# Patient Record
Sex: FEMALE | URBAN - METROPOLITAN AREA
[De-identification: names, ages, dates, MRNs, and addresses within clinical notes are randomized per-mention and may not be internally consistent; named-entity substitution may affect disease eponyms.]

---

## 2020-11-08 PROBLEM — K58.9 IBS (IRRITABLE BOWEL SYNDROME): Status: ACTIVE | Noted: 2020-11-08

## 2020-11-08 PROBLEM — F98.8 ADD (ATTENTION DEFICIT DISORDER): Status: ACTIVE | Noted: 2020-11-08

## 2020-12-30 ENCOUNTER — OFFICE VISIT (OUTPATIENT)
Dept: OBSTETRICS AND GYNECOLOGY | Facility: CLINIC | Age: 45
End: 2020-12-30
Payer: COMMERCIAL

## 2020-12-30 VITALS
SYSTOLIC BLOOD PRESSURE: 122 MMHG | BODY MASS INDEX: 42.69 KG/M2 | WEIGHT: 232 LBS | DIASTOLIC BLOOD PRESSURE: 76 MMHG | HEIGHT: 62 IN | HEART RATE: 87 BPM

## 2020-12-30 DIAGNOSIS — Z01.419 ENCOUNTER FOR WELL WOMAN EXAM WITH ROUTINE GYNECOLOGICAL EXAM: Primary | ICD-10-CM

## 2020-12-30 DIAGNOSIS — Z12.31 BREAST CANCER SCREENING BY MAMMOGRAM: ICD-10-CM

## 2020-12-30 DIAGNOSIS — R53.83 FATIGUE, UNSPECIFIED TYPE: ICD-10-CM

## 2020-12-30 DIAGNOSIS — M85.859 OSTEOPENIA OF HIP, UNSPECIFIED LATERALITY: ICD-10-CM

## 2020-12-30 DIAGNOSIS — N63.0 BREAST LUMP: ICD-10-CM

## 2020-12-30 DIAGNOSIS — L60.9 NAIL ABNORMALITIES: ICD-10-CM

## 2020-12-30 DIAGNOSIS — N64.4 BREAST PAIN: ICD-10-CM

## 2020-12-30 PROCEDURE — 1126F PR PAIN SEVERITY QUANTIFIED, NO PAIN PRESENT: ICD-10-PCS | Mod: S$GLB,,, | Performed by: OBSTETRICS & GYNECOLOGY

## 2020-12-30 PROCEDURE — 1126F AMNT PAIN NOTED NONE PRSNT: CPT | Mod: S$GLB,,, | Performed by: OBSTETRICS & GYNECOLOGY

## 2020-12-30 PROCEDURE — 3008F BODY MASS INDEX DOCD: CPT | Mod: CPTII,S$GLB,, | Performed by: OBSTETRICS & GYNECOLOGY

## 2020-12-30 PROCEDURE — 99396 PREV VISIT EST AGE 40-64: CPT | Mod: S$GLB,,, | Performed by: OBSTETRICS & GYNECOLOGY

## 2020-12-30 PROCEDURE — 99213 OFFICE O/P EST LOW 20 MIN: CPT | Mod: 25,S$GLB,, | Performed by: OBSTETRICS & GYNECOLOGY

## 2020-12-30 PROCEDURE — 99396 PR PREVENTIVE VISIT,EST,40-64: ICD-10-PCS | Mod: S$GLB,,, | Performed by: OBSTETRICS & GYNECOLOGY

## 2020-12-30 PROCEDURE — 99213 PR OFFICE/OUTPT VISIT, EST, LEVL III, 20-29 MIN: ICD-10-PCS | Mod: 25,S$GLB,, | Performed by: OBSTETRICS & GYNECOLOGY

## 2020-12-30 PROCEDURE — 3008F PR BODY MASS INDEX (BMI) DOCUMENTED: ICD-10-PCS | Mod: CPTII,S$GLB,, | Performed by: OBSTETRICS & GYNECOLOGY

## 2020-12-30 RX ORDER — DEXTROAMPHETAMINE SACCHARATE, AMPHETAMINE ASPARTATE MONOHYDRATE, DEXTROAMPHETAMINE SULFATE AND AMPHETAMINE SULFATE 6.25; 6.25; 6.25; 6.25 MG/1; MG/1; MG/1; MG/1
25 CAPSULE, EXTENDED RELEASE ORAL EVERY MORNING
COMMUNITY

## 2020-12-30 NOTE — PROGRESS NOTES
CC: Well Woman -hyst with BSO    HPI:  Patient is a 45 y.o.   who presents for her well woman exam today.  History reviewed with patient.      Patient also would like to discuss some other concerns she has at her wellness visit today. (see problem note below)    Past Medical History:   Diagnosis Date    Abdominal adhesions     severe- pt reports 15 scopes, colonoscopy showd partial obstruction    ADD (attention deficit disorder)     COVID-19 2020    Diabetes     History of left nephrectomy     Hyperinsulinemia     Hyperlipidemia     IBS (irritable bowel syndrome)     Kidney stones     Low vitamin D level     Osteopenia     Pneumonia 2020    Tachycardia     sees cards- sometimes to 160s- on metoprolol       Past Surgical History:   Procedure Laterality Date    BONE GRAFT  2018    for knee becuase wasnt healing    CHOLECYSTECTOMY      HIP SURGERY      had 3 surgeries for torn labrum    KIDNEY SURGERY Left     blocked upj from stone    LAPAROSCOPY      multiple laparoscopies for adhesions (15 per pt)    NEPHRECTOMY Left     after it necrosed    REDUCTION OF BOTH BREASTS      TONSILLECTOMY AND ADENOIDECTOMY  2018    TOTAL ABDOMINAL HYSTERECTOMY W/ BILATERAL SALPINGOOPHORECTOMY  1999    fibroids, adhesions       Social History     Tobacco Use    Smoking status: Never Smoker    Smokeless tobacco: Never Used   Substance Use Topics    Alcohol use: Yes     Alcohol/week: 2.0 standard drinks     Types: 2 Cans of beer per week     Comment: occ    Drug use: Never       Family History   Problem Relation Age of Onset    Breast cancer Other         MGGM    Diabetes Maternal Grandmother        Review of patient's allergies indicates:   Allergen Reactions    Cefprozil     Levofloxacin        Current Outpatient Medications:     dextroamphetamine-amphetamine (ADDERALL XR) 25 MG 24 hr capsule, Take 25 mg by mouth every morning., Disp: , Rfl:     metoprolol tartrate (LOPRESSOR) 100 MG  "tablet, Take 100 mg by mouth 2 (two) times daily., Disp: , Rfl:     exenatide (BYETTA) 10 mcg/dose(250 mcg/mL) 2.4 mL PnIj, Inject 10 mcg into the skin., Disp: , Rfl:     metFORMIN (GLUCOPHAGE) 500 MG tablet, Take 500 mg by mouth., Disp: , Rfl:     methylphenidate HCl 18 MG CR tablet, Take 18 mg by mouth every morning., Disp: , Rfl:     GYN HX:  HX ABNL PAPS:  no abnormals  HRT USE: never used  HYSTERECTOMY SINCE: 1999 for fibroids, pelvic pain, endometriosis and severe adhesions with BSO    HEALTH MAINTENANCE:  (PCP-Abdirizak Ponce MD)    LAST ANNUAL/ PAP:   September 25, 2019    LAST PAP:  neg  LAST MMG:  January 3, 2020  [facility] at [facility]  LAST LABS:  due now- does with pcp  LAST COLON: 2016- neg- Q 10 yrs- w/  in [place]  LAST DEXA: 2017 osteopenia at office    ROS:  Review of Systems   Constitutional: Negative for fatigue, fever and unexpected weight change.   Respiratory: Negative for cough and shortness of breath.    Cardiovascular: Negative for chest pain and palpitations.   Gastrointestinal: Negative for abdominal pain, bloating, blood in stool, constipation, diarrhea, nausea and vomiting.   Endocrine: Negative for hair loss and hot flashes.   Genitourinary: Negative for dysuria, frequency, genital sores, hematuria, menstrual problem, pelvic pain, urgency, vaginal discharge, urinary incontinence and vaginal odor.   Integumentary:  Negative for rash, acne, mole/lesion, breast mass, nipple discharge, breast skin changes and breast tenderness.   Neurological: Negative for headaches.   Psychiatric/Behavioral: Negative for depression and sleep disturbance.   Breast: Negative for asymmetry, lump, mass, nipple discharge, skin changes and tenderness    VITALS:  No LMP recorded. Patient has had a hysterectomy.  Vitals:    12/30/20 0902   BP: 122/76   Pulse: 87   Weight: 105.2 kg (232 lb)   Height: 5' 2" (1.575 m)     Body mass index is 42.43 kg/m².     PHYSICAL EXAM:  Physical Exam:   Constitutional: She is " oriented to person, place, and time. She appears well-developed and well-nourished. She is cooperative. She does not appear ill.      Neck: No thyromegaly present.    Cardiovascular: Normal rate.     Pulmonary/Chest: Effort normal. No respiratory distress. She exhibits no deformity. Right breast exhibits no mass, no nipple discharge, no skin change, no tenderness and no swelling. Left breast exhibits no mass, no nipple discharge, no skin change, no tenderness and no swelling. Breasts are symmetrical.        Abdominal: Soft. Normal appearance. She exhibits no distension. There is no splenomegaly or hepatomegaly. There is no abdominal tenderness. Hernia confirmed negative in the umbilical area and confirmed negative in the ventral area.     Genitourinary:    Vagina normal.      Pelvic exam was performed with patient supine.   There is no rash, tenderness or lesion on the right labia. There is no rash, tenderness or lesion on the left labia. Uterus is absent. Right adnexum displays no mass, no tenderness and no fullness. Left adnexum displays no mass, no tenderness and no fullness. No erythema, tenderness, rectocele, cystocele or unspecified prolapse of vaginal walls in the vagina. Vaginal cuff normal.Labial bartholins normal.Cervix exhibits absence. negative for vaginal discharge              Neurological: She is alert and oriented to person, place, and time.    Skin: Skin is warm and dry. No lesion and no rash noted.    Psychiatric: She has a normal mood and affect. Her speech is normal and behavior is normal. Judgment and thought content normal.     *female chaperone present for exam    ASSESSMENT and PLAN:  Encounter for well woman exam with routine gynecological exam  -     Liquid-based pap smear, screening    Breast cancer screening by mammogram  -     Mammo Digital Screening Bilat w/ Hernandez; Future; Expected date: 01/29/2021    Osteopenia of hip, unspecified laterality  -     DXA Bone Density Spine And Hip;  Future; Expected date: 01/30/2021      FOLLOWUP:  Follow up in about 1 year (around 12/30/2021).     COUNSELING:  Patient was counseled today on A.C.S. Pap guidelines and recommendations for yearly pelvic exam, monthly self breast exams, annual mammograms, and screening colonoscopy starting at age 50. Encouraged patient to see her PCP for other health maintenance.       PROBLEM VISIT:    PROBLEM HPI:     Pt c/o fatigue and nails twisting and brittle and wants labs- hasnt had labs this year and not sure if it is just recovering from covid or if something else is wrong.  Also has had a tender lump in left lateral breast for last few months.     Review of Systems   Constitutional: Negative for fatigue, fever and unexpected weight change.   Respiratory: Negative for cough and shortness of breath.    Cardiovascular: Negative for chest pain and palpitations.   Gastrointestinal: Negative for abdominal pain, bloating, blood in stool, constipation, diarrhea, nausea and vomiting.   Endocrine: Negative for hair loss and hot flashes.   Genitourinary: Negative for dysuria, frequency, genital sores, hematuria, menstrual problem, pelvic pain, urgency, vaginal discharge, urinary incontinence and vaginal odor.   Integumentary:  Negative for rash, acne, mole/lesion, breast mass, nipple discharge, breast skin changes and breast tenderness.   Neurological: Negative for headaches.   Psychiatric/Behavioral: Negative for depression and sleep disturbance.   Breast: Negative for asymmetry, lump, mass, nipple discharge, skin changes and tenderness  Physical Exam:   Constitutional: She is oriented to person, place, and time. She appears well-developed and well-nourished. She is cooperative. She does not appear ill.      Neck: No thyromegaly present.    Cardiovascular: Normal rate.     Pulmonary/Chest: Effort normal. No respiratory distress. She exhibits no deformity. Right breast exhibits no mass, no nipple discharge, no skin change, no  tenderness and no swelling. Left breast exhibits no mass, no nipple discharge, no skin change, no tenderness and no swelling. Breasts are symmetrical.        Abdominal: Soft. Normal appearance. She exhibits no distension. There is no splenomegaly or hepatomegaly. There is no abdominal tenderness. Hernia confirmed negative in the umbilical area and confirmed negative in the ventral area.     Genitourinary:    Vagina normal.      Pelvic exam was performed with patient supine.   There is no rash, tenderness or lesion on the right labia. There is no rash, tenderness or lesion on the left labia. Uterus is absent. Right adnexum displays no mass, no tenderness and no fullness. Left adnexum displays no mass, no tenderness and no fullness. No erythema, tenderness, rectocele, cystocele or unspecified prolapse of vaginal walls in the vagina. Vaginal cuff normal.Labial bartholins normal.Cervix exhibits absence. negative for vaginal discharge              Neurological: She is alert and oriented to person, place, and time.    Skin: Skin is warm and dry. No lesion and no rash noted.    Psychiatric: She has a normal mood and affect. Her speech is normal and behavior is normal. Judgment and thought content normal.      PROBLEM ASSESMENT and PLAN:  Encounter for well woman exam with routine gynecological exam  -     Liquid-based pap smear, screening  -     Lipid Panel; Future; Expected date: 12/30/2020    Breast cancer screening by mammogram  -     Mammo Digital Screening Bilat w/ Hernandez; Future; Expected date: 01/29/2021    Osteopenia of hip, unspecified laterality  -     DXA Bone Density Spine And Hip; Future; Expected date: 01/30/2021    Breast lump  -     Mammo Digital Diagnostic Bilat with Hernandez; Future; Expected date: 01/06/2021  -     US Breast Left Complete; Future; Expected date: 12/31/2020    Breast pain  -     Mammo Digital Diagnostic Bilat with Hernandez; Future; Expected date: 01/06/2021  -     US Breast Left Complete; Future;  Expected date: 12/31/2020    Fatigue, unspecified type  -     Calcitriol; Future; Expected date: 12/30/2020  -     CBC Auto Differential; Future; Expected date: 12/30/2020  -     Comprehensive Metabolic Panel; Future; Expected date: 12/30/2020  -     Urinalysis, Reflex to Urine Culture Urine, Clean Catch; Future  -     T4, Free; Future; Expected date: 12/30/2020  -     TSH; Future; Expected date: 12/30/2020  -     JAGDISH by IFA, w/Rflx; Future; Expected date: 12/30/2020  -     Vitamin B12; Future; Expected date: 12/30/2020  -     Hemoglobin A1C; Future; Expected date: 12/30/2020    Nail abnormalities       *Total time spent: 30 min. 50 % or more was spent on counseling about diagnosis, treatment options, and coordination of care.

## 2021-01-04 LAB
ABS NRBC COUNT: 0 X 10 3/UL (ref 0–0.01)
ABSOLUTE BASOPHIL: 0.06 X 10 3/UL (ref 0–0.22)
ABSOLUTE EOSINOPHIL: 0.16 X 10 3/UL (ref 0.04–0.54)
ABSOLUTE IMMATURE GRAN: 0.02 X 10 3/UL (ref 0–0.04)
ABSOLUTE LYMPHOCYTE: 3.17 X 10 3/UL (ref 0.86–4.75)
ABSOLUTE MONOCYTE: 0.63 X 10 3/UL (ref 0.22–1.08)
ALBUMIN SERPL-MCNC: 4.2 G/DL (ref 3.5–5.2)
ALBUMIN/GLOB SERPL ELPH: 1.4 {RATIO} (ref 1–2.7)
ALP ISOS SERPL LEV INH-CCNC: 74 U/L (ref 35–105)
ALT (SGPT): 19 U/L (ref 0–33)
AMORPH URATE CRY URNS QL MICRO: NEGATIVE
ANA SER-ACNC: NEGATIVE
ANION GAP SERPL CALC-SCNC: 11 MMOL/L (ref 8–17)
AST SERPL-CCNC: 15 U/L (ref 0–32)
B12: 543 PG/ML (ref 232–1245)
BACTERIA #/AREA URNS HPF: ABNORMAL /[HPF]
BASOPHILS NFR BLD: 0.7 % (ref 0.2–1.2)
BILIRUB UR QL STRIP: NEGATIVE
BILIRUBIN, TOTAL: 0.22 MG/DL (ref 0–1.2)
BUN/CREAT SERPL: 16.7 (ref 6–20)
CALCIUM SERPL-MCNC: 9.5 MG/DL (ref 8.6–10.2)
CARBON DIOXIDE, CO2: 25 MMOL/L (ref 22–29)
CHLORIDE: 105 MMOL/L (ref 98–107)
CHOLEST SERPL-MSCNC: 189 MG/DL (ref 100–200)
CLARITY UR: ABNORMAL
COLOR UR: YELLOW
CREAT SERPL-MCNC: 0.79 MG/DL (ref 0.5–0.9)
EOSINOPHIL NFR BLD: 1.8 % (ref 0.7–7)
EPITHELIAL CELLS: ABNORMAL
ESTIMATED AVERAGE GLUCOSE: 148 MG/DL
GFR ESTIMATION: 79.06
GLOBULIN: 2.9 G/DL (ref 1.5–4.5)
GLUCOSE (UA): NEGATIVE MG/DL
GLUCOSE: 117 MG/DL (ref 74–106)
HBA1C MFR BLD: 6.8 % (ref 4–6)
HCT VFR BLD AUTO: 43 % (ref 37–47)
HDLC SERPL-MCNC: 51 MG/DL
HGB BLD-MCNC: 13 G/DL (ref 12–16)
IMMATURE GRANULOCYTES: 0.2 % (ref 0–0.5)
KETONES UR QL STRIP: NEGATIVE MG/DL
LDL/HDL RATIO: 1.9 (ref 1–3)
LDLC SERPL CALC-MCNC: 98.2 MG/DL (ref 0–100)
LEUKOCYTE ESTERASE UR QL STRIP: NEGATIVE
LYMPHOCYTES NFR BLD: 35.7 % (ref 19.3–53.1)
MCH RBC QN AUTO: 27.5 PG (ref 27–32)
MCHC RBC AUTO-ENTMCNC: 30.2 G/DL (ref 32–36)
MCV RBC AUTO: 91.1 FL (ref 82–100)
MONOCYTES NFR BLD: 7.1 % (ref 4.7–12.5)
MUCOUS THREADS URNS QL MICRO: NEGATIVE
NEUTROPHILS # BLD AUTO: 4.85 X 10 3/UL (ref 2.15–7.56)
NEUTROPHILS NFR BLD: 54.5 %
NITRITE UR QL STRIP: NEGATIVE
NUCLEATED RED BLOOD CELLS: 0 /100 WBC (ref 0–0.2)
OCCULT BLOOD: ABNORMAL
PH, URINE: 5 (ref 5–7.5)
PLATELET # BLD AUTO: 359 X 10 3/UL (ref 135–400)
POTASSIUM: 4.7 MMOL/L (ref 3.5–5.1)
PROT SNV-MCNC: 7.1 G/DL (ref 6.4–8.3)
PROT UR QL STRIP: NEGATIVE MG/DL
RBC # BLD AUTO: 4.72 X 10 6/UL (ref 4.2–5.4)
RBC/HPF: ABNORMAL
RDW-SD: 45.8 FL (ref 37–54)
SODIUM: 141 MMOL/L (ref 136–145)
SP GR UR STRIP: 1.02 (ref 1–1.03)
T4, FREE: 0.81 NG/DL (ref 0.93–1.7)
TRIGL SERPL-MCNC: 199 MG/DL (ref 0–150)
TSH SERPL DL<=0.005 MIU/L-ACNC: 1.89 UIU/ML (ref 0.27–4.2)
UREA NITROGEN (BUN): 13.2 MG/DL (ref 6–20)
UROBILINOGEN, URINE: NORMAL E.U./DL (ref 0–1)
VITAMIN D (25OHD): 29.5 NG/ML
WBC # BLD: 8.89 X 10 3/UL (ref 4.3–10.8)
WBC/HPF: ABNORMAL

## 2021-01-07 DIAGNOSIS — E03.9 HYPOTHYROIDISM, UNSPECIFIED TYPE: Primary | ICD-10-CM

## 2021-01-07 RX ORDER — LEVOTHYROXINE SODIUM 50 UG/1
50 TABLET ORAL
Qty: 30 TABLET | Refills: 11 | Status: SHIPPED | OUTPATIENT
Start: 2021-01-07 | End: 2022-01-07

## 2021-01-08 ENCOUNTER — TELEPHONE (OUTPATIENT)
Dept: OBSTETRICS AND GYNECOLOGY | Facility: CLINIC | Age: 46
End: 2021-01-08

## 2021-01-08 NOTE — PROGRESS NOTES
Please let pt know we have results for her:  -Pap and hpv neg  -Her thyroid is underactive and I will send out some synthroid to get her started on till she sees her pcp  -Her aic and glucose are elevated so she is prediabetic and needs to cut down on carbs in her diet and increase exercise and fiber intake  -her vitamin d3 was 29.5 and normal is 30-80 so she is just a little low but I would recommend 5000iu q d of vitamin D3 and she can recheck that and her thyroid labs in about 8 weeks.    - her cholesterol is also elevated  -Since she is on the portal she should be able to show these labs to her pcp for further recommendations

## 2021-01-11 ENCOUNTER — TELEPHONE (OUTPATIENT)
Dept: OBSTETRICS AND GYNECOLOGY | Facility: CLINIC | Age: 46
End: 2021-01-11

## 2021-01-13 ENCOUNTER — PATIENT MESSAGE (OUTPATIENT)
Dept: OBSTETRICS AND GYNECOLOGY | Facility: CLINIC | Age: 46
End: 2021-01-13

## 2021-01-14 DIAGNOSIS — R92.8 ABNORMAL SCREENING MAMMOGRAM: Primary | ICD-10-CM

## 2021-10-19 ENCOUNTER — OFFICE VISIT (OUTPATIENT)
Dept: OBSTETRICS AND GYNECOLOGY | Facility: CLINIC | Age: 46
End: 2021-10-19
Payer: COMMERCIAL

## 2021-10-19 VITALS
BODY MASS INDEX: 43.24 KG/M2 | HEART RATE: 87 BPM | WEIGHT: 235 LBS | HEIGHT: 62 IN | DIASTOLIC BLOOD PRESSURE: 78 MMHG | SYSTOLIC BLOOD PRESSURE: 110 MMHG

## 2021-10-19 DIAGNOSIS — N63.0 BREAST SWELLING: ICD-10-CM

## 2021-10-19 DIAGNOSIS — N64.4 BREAST PAIN: Primary | ICD-10-CM

## 2021-10-19 PROCEDURE — 3044F PR MOST RECENT HEMOGLOBIN A1C LEVEL <7.0%: ICD-10-PCS | Mod: CPTII,S$GLB,, | Performed by: OBSTETRICS & GYNECOLOGY

## 2021-10-19 PROCEDURE — 3044F HG A1C LEVEL LT 7.0%: CPT | Mod: CPTII,S$GLB,, | Performed by: OBSTETRICS & GYNECOLOGY

## 2021-10-19 PROCEDURE — 1160F PR REVIEW ALL MEDS BY PRESCRIBER/CLIN PHARMACIST DOCUMENTED: ICD-10-PCS | Mod: CPTII,S$GLB,, | Performed by: OBSTETRICS & GYNECOLOGY

## 2021-10-19 PROCEDURE — 3078F PR MOST RECENT DIASTOLIC BLOOD PRESSURE < 80 MM HG: ICD-10-PCS | Mod: CPTII,S$GLB,, | Performed by: OBSTETRICS & GYNECOLOGY

## 2021-10-19 PROCEDURE — 1159F MED LIST DOCD IN RCRD: CPT | Mod: CPTII,S$GLB,, | Performed by: OBSTETRICS & GYNECOLOGY

## 2021-10-19 PROCEDURE — 3078F DIAST BP <80 MM HG: CPT | Mod: CPTII,S$GLB,, | Performed by: OBSTETRICS & GYNECOLOGY

## 2021-10-19 PROCEDURE — 3008F PR BODY MASS INDEX (BMI) DOCUMENTED: ICD-10-PCS | Mod: CPTII,S$GLB,, | Performed by: OBSTETRICS & GYNECOLOGY

## 2021-10-19 PROCEDURE — 99214 PR OFFICE/OUTPT VISIT, EST, LEVL IV, 30-39 MIN: ICD-10-PCS | Mod: S$GLB,,, | Performed by: OBSTETRICS & GYNECOLOGY

## 2021-10-19 PROCEDURE — 99214 OFFICE O/P EST MOD 30 MIN: CPT | Mod: S$GLB,,, | Performed by: OBSTETRICS & GYNECOLOGY

## 2021-10-19 PROCEDURE — 3074F SYST BP LT 130 MM HG: CPT | Mod: CPTII,S$GLB,, | Performed by: OBSTETRICS & GYNECOLOGY

## 2021-10-19 PROCEDURE — 1159F PR MEDICATION LIST DOCUMENTED IN MEDICAL RECORD: ICD-10-PCS | Mod: CPTII,S$GLB,, | Performed by: OBSTETRICS & GYNECOLOGY

## 2021-10-19 PROCEDURE — 3074F PR MOST RECENT SYSTOLIC BLOOD PRESSURE < 130 MM HG: ICD-10-PCS | Mod: CPTII,S$GLB,, | Performed by: OBSTETRICS & GYNECOLOGY

## 2021-10-19 PROCEDURE — 1160F RVW MEDS BY RX/DR IN RCRD: CPT | Mod: CPTII,S$GLB,, | Performed by: OBSTETRICS & GYNECOLOGY

## 2021-10-19 PROCEDURE — 3008F BODY MASS INDEX DOCD: CPT | Mod: CPTII,S$GLB,, | Performed by: OBSTETRICS & GYNECOLOGY

## 2021-10-19 RX ORDER — DULAGLUTIDE 1.5 MG/.5ML
INJECTION, SOLUTION SUBCUTANEOUS
COMMUNITY
End: 2021-10-19

## 2021-10-19 RX ORDER — DULAGLUTIDE 0.75 MG/.5ML
INJECTION, SOLUTION SUBCUTANEOUS
COMMUNITY
Start: 2021-08-27 | End: 2022-01-04

## 2021-10-19 RX ORDER — METOPROLOL TARTRATE 37.5 MG/1
1 TABLET, FILM COATED ORAL 2 TIMES DAILY PRN
COMMUNITY
Start: 2021-09-09

## 2021-10-25 LAB
ANA SER-ACNC: NEGATIVE
PROLACTIN SERPL-MCNC: 6.46 NG/ML (ref 4.79–23.3)

## 2021-10-26 ENCOUNTER — TELEPHONE (OUTPATIENT)
Dept: OBSTETRICS AND GYNECOLOGY | Facility: CLINIC | Age: 46
End: 2021-10-26
Payer: COMMERCIAL

## 2021-10-31 DIAGNOSIS — N60.09 CYST OF BREAST, UNSPECIFIED LATERALITY: ICD-10-CM

## 2021-10-31 DIAGNOSIS — N64.4 BREAST PAIN: Primary | ICD-10-CM

## 2021-10-31 DIAGNOSIS — N63.0 BREAST SWELLING: ICD-10-CM

## 2021-11-01 ENCOUNTER — TELEPHONE (OUTPATIENT)
Dept: OBSTETRICS AND GYNECOLOGY | Facility: CLINIC | Age: 46
End: 2021-11-01
Payer: COMMERCIAL

## 2022-01-04 ENCOUNTER — OFFICE VISIT (OUTPATIENT)
Dept: OBSTETRICS AND GYNECOLOGY | Facility: CLINIC | Age: 47
End: 2022-01-04
Payer: COMMERCIAL

## 2022-01-04 VITALS
WEIGHT: 237.63 LBS | DIASTOLIC BLOOD PRESSURE: 69 MMHG | BODY MASS INDEX: 43.73 KG/M2 | HEIGHT: 62 IN | SYSTOLIC BLOOD PRESSURE: 106 MMHG | HEART RATE: 81 BPM

## 2022-01-04 DIAGNOSIS — Z12.31 BREAST CANCER SCREENING BY MAMMOGRAM: ICD-10-CM

## 2022-01-04 DIAGNOSIS — Z01.419 ENCOUNTER FOR WELL WOMAN EXAM WITH ROUTINE GYNECOLOGICAL EXAM: Primary | ICD-10-CM

## 2022-01-04 DIAGNOSIS — N64.4 BREAST PAIN: ICD-10-CM

## 2022-01-04 PROCEDURE — 3008F PR BODY MASS INDEX (BMI) DOCUMENTED: ICD-10-PCS | Mod: CPTII,S$GLB,, | Performed by: OBSTETRICS & GYNECOLOGY

## 2022-01-04 PROCEDURE — 99396 PREV VISIT EST AGE 40-64: CPT | Mod: S$GLB,,, | Performed by: OBSTETRICS & GYNECOLOGY

## 2022-01-04 PROCEDURE — 3074F PR MOST RECENT SYSTOLIC BLOOD PRESSURE < 130 MM HG: ICD-10-PCS | Mod: CPTII,S$GLB,, | Performed by: OBSTETRICS & GYNECOLOGY

## 2022-01-04 PROCEDURE — 1159F PR MEDICATION LIST DOCUMENTED IN MEDICAL RECORD: ICD-10-PCS | Mod: CPTII,S$GLB,, | Performed by: OBSTETRICS & GYNECOLOGY

## 2022-01-04 PROCEDURE — 3078F PR MOST RECENT DIASTOLIC BLOOD PRESSURE < 80 MM HG: ICD-10-PCS | Mod: CPTII,S$GLB,, | Performed by: OBSTETRICS & GYNECOLOGY

## 2022-01-04 PROCEDURE — 1159F MED LIST DOCD IN RCRD: CPT | Mod: CPTII,S$GLB,, | Performed by: OBSTETRICS & GYNECOLOGY

## 2022-01-04 PROCEDURE — 1160F RVW MEDS BY RX/DR IN RCRD: CPT | Mod: CPTII,S$GLB,, | Performed by: OBSTETRICS & GYNECOLOGY

## 2022-01-04 PROCEDURE — 1160F PR REVIEW ALL MEDS BY PRESCRIBER/CLIN PHARMACIST DOCUMENTED: ICD-10-PCS | Mod: CPTII,S$GLB,, | Performed by: OBSTETRICS & GYNECOLOGY

## 2022-01-04 PROCEDURE — 3008F BODY MASS INDEX DOCD: CPT | Mod: CPTII,S$GLB,, | Performed by: OBSTETRICS & GYNECOLOGY

## 2022-01-04 PROCEDURE — 3074F SYST BP LT 130 MM HG: CPT | Mod: CPTII,S$GLB,, | Performed by: OBSTETRICS & GYNECOLOGY

## 2022-01-04 PROCEDURE — 3078F DIAST BP <80 MM HG: CPT | Mod: CPTII,S$GLB,, | Performed by: OBSTETRICS & GYNECOLOGY

## 2022-01-04 PROCEDURE — 99396 PR PREVENTIVE VISIT,EST,40-64: ICD-10-PCS | Mod: S$GLB,,, | Performed by: OBSTETRICS & GYNECOLOGY

## 2022-01-04 RX ORDER — DULAGLUTIDE 1.5 MG/.5ML
INJECTION, SOLUTION SUBCUTANEOUS
COMMUNITY
Start: 2021-11-29 | End: 2023-04-05

## 2022-01-04 NOTE — PROGRESS NOTES
WELL WOMAN ( hyst with BSO)    Patient Care Team:  Abdirizak Ponce MD as PCP - General (Pediatrics)    The patient's last visit with me was on 10/19/2021 for bilateral breast pain. Had diag gilberto mmg and us showing a .3x .4cm cyst on right. Has been taking the vitamin e and avoiding caffeine which helps some. Had offered a referral to [facility] at that time as Scar had seen her and recommended a repeat reduction.  Pt reports it hasnt worsened and she thinks it is scar tissue like she has intraabdominally and will wait until it is unbearable to do anything.      HPI:  Patient is a 46 y.o. who presents for her well woman exam today.  History reviewed with patient. Pt without new complaints today. Is still considering the covid vaccine unsure because she has only one kidney and afraid a vaccine reaction could cause her to be on dialysis. Advised that covid itself more likely to cause a problem like that than a vaccine reaction but patient will continue to research it    Her hyst was in  for fibroids, pelvic pain, endometriosis and and severe adhesions    HRT:  never used    REVIEW OF PRIOR DATA/ HEALTH MAINTENANCE:  LAST ANNUAL/ PAP:   2020   LAST LABS- due now- does with pcp   LAST MMG (diag)-  2021 at [facility] and LAST BREAST US-  2021 at [facility]    LAST COLONOSCOPY- 2016- neg- Q 10 yrs- by  out of town   LAST DEXA- 2017- normal at [facility]    Past Medical History:   Diagnosis Date    Abdominal adhesions     severe- pt reports 15 scopes, colonoscopy showd partial obstruction    ADD (attention deficit disorder)     Diabetes     History of left nephrectomy     Hyperinsulinemia     Hyperlipidemia     IBS (irritable bowel syndrome)     Kidney stones     Low vitamin D level     Pneumonia 2020    Tachycardia     sees cards- sometimes to 160s- on metoprolol     SURGICAL HX:   has a past surgical history that includes Hip surgery; Reduction of both breasts;  "Total abdominal hysterectomy w/ bilateral salpingoophorectomy (1999); Tonsillectomy and adenoidectomy (2018); Cholecystectomy; Kidney surgery (Left, 1988); Bone graft (2018); Laparoscopy; and Nephrectomy (Left, 1989).    SOCIAL HX:    reports that she has never smoked. She has never used smokeless tobacco. She reports current alcohol use of about 2.0 standard drinks of alcohol per week. She reports that she does not use drugs.    FAMILY HX:   family history includes Breast cancer in her other; Breast cancer (age of onset: 65) in her maternal aunt; Diabetes in her maternal grandmother. .    ALLERGIES:  Cefprozil and Levofloxacin    Current Outpatient Medications:     dextroamphetamine-amphetamine (ADDERALL XR) 25 MG 24 hr capsule, Take 25 mg by mouth every morning., Disp: , Rfl:     metoprolol tartrate 37.5 mg Tab, Take 1 tablet by mouth 2 (two) times daily as needed., Disp: , Rfl:     SYNTHROID 50 mcg tablet, Take 1 tablet (50 mcg total) by mouth before breakfast., Disp: 30 tablet, Rfl: 11    TRULICITY 1.5 mg/0.5 mL pen injector, INJECT 1 PEN SUBCUTANEOUSLY ONCE A WEEK, Disp: , Rfl:     ROS:  CONST:  No fever, chills, fatigue or unexpected changes in weight  CV: No chest pain or palpitations  RESP:  No shortness of breath or cough  GI: No abd pain, vomiting, diarrhea, blood in stool, or changes in bowel mvmts  SKIN: No rashes or lesions  MUSCULOSKELETAL: No joint swelling or pain  PSYCH: No changes in mood or insomia  BREASTS: No asymmetry, lumps, pain, nipple discharge, or skin changes  :  No dysuria, urgency, frequency, hematuria or incontinence          No vag dc, itching, odor or dryness          No pelvic pain, dyspareunia, or abnormal vaginal bleeding    VITALS:  Blood pressure 106/69, pulse 81, height 5' 2" (1.575 m), weight 107.8 kg (237 lb 9.6 oz).  Body mass index is 43.46 kg/m².     PHYSICAL EXAM-  APPEARANCE: Well appearing, in no acute distress.  NECK: Neck symmetric without masses or " thyromegaly.  CV:  Normal rate  PULM: Normal resp rate, no resp distress, normal resp effort  PSYCH:  Normal mood and affect, cooperative  SKIN: No rashes, lesions, or abnormal bruising  LYMPH: No inguinal or axillary adenopathy  ABD: Soft, without tenderness or masses. No palpable hernias or hsm  BREASTS: Symmetrical, no skin changes or lesions. No palpable masses, tenderness, or nipple dc  PELVIC:  VULVA: No lesions. Normal female genitalia.  URETHRAL MEATUS: No masses, no prolapse.  BLADDER/ URETHRA: No masses or suprapubic tenderness  VAGINA/ CUFF: Normal rugae, no discharge, no masses, no significant cystocele or rectocele.  PELVIS: No masses, tenderness, or fullness on bimanual exam  ANUS/ PERINEUM: Normal tone.  No lesions.  *female chaperone present for entire exam    ASSESSMENT and PLAN:  Encounter for well woman exam with routine gynecological exam  -     Liquid-based pap smear, screening    Breast cancer screening by mammogram  -     Mammo Digital Screening Bilat w/ Hernandez; Future; Expected date: 01/18/2022    Breast pain-chronic likely due to scarring from breast reduction and fibrocystic tissue      FOLLOWUP:  1 yr for wwe or sooner prn    COUNSELING:  Patient was counseled today on recommendation for yearly pelvic exam/current Pap guidelines, self breast exams, annual screening mammograms and routine screening colonoscopy starting at age 45.  Encouraged patient to see her PCP for other health maintenance.        Pain Refusal Text: I offered to prescribe pain medication but the patient refused to take this medication.

## 2023-04-06 ENCOUNTER — OFFICE VISIT (OUTPATIENT)
Dept: OBSTETRICS AND GYNECOLOGY | Facility: CLINIC | Age: 48
End: 2023-04-06
Payer: COMMERCIAL

## 2023-04-06 VITALS
HEIGHT: 62 IN | HEART RATE: 88 BPM | DIASTOLIC BLOOD PRESSURE: 63 MMHG | WEIGHT: 220.63 LBS | BODY MASS INDEX: 40.6 KG/M2 | SYSTOLIC BLOOD PRESSURE: 100 MMHG

## 2023-04-06 DIAGNOSIS — Z01.419 ENCOUNTER FOR WELL WOMAN EXAM WITH ROUTINE GYNECOLOGICAL EXAM: Primary | ICD-10-CM

## 2023-04-06 DIAGNOSIS — Z12.31 BREAST CANCER SCREENING BY MAMMOGRAM: ICD-10-CM

## 2023-04-06 PROBLEM — E66.01 MORBID OBESITY: Status: ACTIVE | Noted: 2023-04-06

## 2023-04-06 PROBLEM — E03.9 HYPOTHYROIDISM: Status: ACTIVE | Noted: 2023-04-06

## 2023-04-06 PROBLEM — K21.9 GASTRO-ESOPHAGEAL REFLUX DISEASE WITHOUT ESOPHAGITIS: Status: ACTIVE | Noted: 2023-04-06

## 2023-04-06 PROBLEM — K40.90 UNILATERAL INGUINAL HERNIA, WITHOUT OBSTRUCTION OR GANGRENE, NOT SPECIFIED AS RECURRENT: Status: ACTIVE | Noted: 2023-04-06

## 2023-04-06 PROBLEM — F90.9 ATTENTION DEFICIT HYPERACTIVITY DISORDER: Status: ACTIVE | Noted: 2020-11-08

## 2023-04-06 PROBLEM — E11.9 TYPE 2 DIABETES MELLITUS WITHOUT COMPLICATION: Status: ACTIVE | Noted: 2023-04-06

## 2023-04-06 PROCEDURE — 1159F PR MEDICATION LIST DOCUMENTED IN MEDICAL RECORD: ICD-10-PCS | Mod: CPTII,S$GLB,, | Performed by: OBSTETRICS & GYNECOLOGY

## 2023-04-06 PROCEDURE — 3008F PR BODY MASS INDEX (BMI) DOCUMENTED: ICD-10-PCS | Mod: CPTII,S$GLB,, | Performed by: OBSTETRICS & GYNECOLOGY

## 2023-04-06 PROCEDURE — 99396 PR PREVENTIVE VISIT,EST,40-64: ICD-10-PCS | Mod: S$GLB,,, | Performed by: OBSTETRICS & GYNECOLOGY

## 2023-04-06 PROCEDURE — 3078F PR MOST RECENT DIASTOLIC BLOOD PRESSURE < 80 MM HG: ICD-10-PCS | Mod: CPTII,S$GLB,, | Performed by: OBSTETRICS & GYNECOLOGY

## 2023-04-06 PROCEDURE — 99396 PREV VISIT EST AGE 40-64: CPT | Mod: S$GLB,,, | Performed by: OBSTETRICS & GYNECOLOGY

## 2023-04-06 PROCEDURE — 3074F SYST BP LT 130 MM HG: CPT | Mod: CPTII,S$GLB,, | Performed by: OBSTETRICS & GYNECOLOGY

## 2023-04-06 PROCEDURE — 1159F MED LIST DOCD IN RCRD: CPT | Mod: CPTII,S$GLB,, | Performed by: OBSTETRICS & GYNECOLOGY

## 2023-04-06 PROCEDURE — 3008F BODY MASS INDEX DOCD: CPT | Mod: CPTII,S$GLB,, | Performed by: OBSTETRICS & GYNECOLOGY

## 2023-04-06 PROCEDURE — 3078F DIAST BP <80 MM HG: CPT | Mod: CPTII,S$GLB,, | Performed by: OBSTETRICS & GYNECOLOGY

## 2023-04-06 PROCEDURE — 3074F PR MOST RECENT SYSTOLIC BLOOD PRESSURE < 130 MM HG: ICD-10-PCS | Mod: CPTII,S$GLB,, | Performed by: OBSTETRICS & GYNECOLOGY

## 2023-04-06 RX ORDER — PANTOPRAZOLE SODIUM 40 MG/1
40 TABLET, DELAYED RELEASE ORAL
COMMUNITY
Start: 2023-02-15

## 2023-04-06 RX ORDER — SEMAGLUTIDE 2.68 MG/ML
INJECTION, SOLUTION SUBCUTANEOUS
COMMUNITY
Start: 2023-03-13

## 2023-04-06 RX ORDER — ONDANSETRON 8 MG/1
TABLET, ORALLY DISINTEGRATING ORAL
COMMUNITY
Start: 2023-03-20

## 2023-04-06 RX ORDER — LEVOTHYROXINE SODIUM 50 UG/1
50 TABLET ORAL
COMMUNITY
Start: 2023-03-15

## 2023-04-06 NOTE — PROGRESS NOTES
CC:  WELL WOMAN ( hyst with BSO)    Patient Care Team:  Abdirizak Ponce MD as PCP - General (Pediatrics)    Last visit with me was on  2022 for wwe    HPI:  Patient is a 47 y.o. who presents for her well woman exam today.  History reviewed with patient.   Patient is without complaints or concerns today.     Her hyst was in    for fibroids, pelvic pain, endometriosis and and severe adhesions     HRT:  never used  HX ABNL PAPS: none    REVIEW OF PRIOR DATA/ HEALTH MAINTENANCE:  LAST ANNUAL:   2022    LAST MMG (screening)- 2022- normal at [facility]   LAST LABS- up to date with PCP     LAST COLONOSCOPY- - by  out of town - q 10 yrs (neg)     Past Medical History:   Diagnosis Date    Abdominal adhesions     severe- pt reports 15 scopes, colonoscopy showd partial obstruction    ADD (attention deficit disorder)     Diabetes     History of left nephrectomy     Hyperinsulinemia     Hyperlipidemia     IBS (irritable bowel syndrome)     Kidney stones     Low vitamin D level     Pneumonia     when she had covid    Tachycardia     sees cards- sometimes to 160s- on metoprolol    Unilateral inguinal hernia, without obstruction or gangrene, not specified as recurrent      SURGICAL HX:   has a past surgical history that includes Hip surgery; Reduction of both breasts; Total abdominal hysterectomy w/ bilateral salpingoophorectomy (); Tonsillectomy and adenoidectomy (); Cholecystectomy; Kidney surgery (Left, ); Bone graft (); Laparoscopy; Nephrectomy (Left, ); and Hip arthroscopy w/ labral repair (2023).    SOCIAL HX:    reports that she has never smoked. She has never used smokeless tobacco. She reports current alcohol use of about 2.0 standard drinks per week. She reports that she does not use drugs.    FAMILY HX:   family history includes Breast cancer in an other family member; Breast cancer (age of onset: 65) in her maternal aunt; Diabetes in her maternal  "grandmother. .    ALLERGIES:  Cefprozil and Levofloxacin    Current Outpatient Medications   Medication Instructions    dextroamphetamine-amphetamine (ADDERALL XR) 25 MG 24 hr capsule 25 mg, Oral, Every morning    levothyroxine (SYNTHROID) 50 mcg, Oral    metoprolol tartrate 37.5 mg Tab 1 tablet, Oral, 2 times daily PRN    ondansetron (ZOFRAN-ODT) 8 MG TbDL DISSOLVE 1 TABLET ON THE TONGUE EVERY 8 HOURS    OZEMPIC 2 mg/dose (8 mg/3 mL) PnIj INJECT 2MG IN THE SKIN ONCE A WEEK    pantoprazole (PROTONIX) 40 mg, Oral     ROS:  CONST:  No fever, chills, fatigue or unexpected changes in weight   CV: No chest pain or palpitations   RESP:  No shortness of breath or cough   GI: No abd pain, vomiting, diarrhea, blood in stool, or changes in bowel mvmts   SKIN: No rashes or lesions  MUSCULOSKELETAL: No joint swelling or pain   PSYCH: No changes in mood or insomia   BREASTS: No asymmetry, lumps, pain, nipple discharge, or skin changes   :  No dysuria, urgency, frequency, hematuria or incontinence           No vag dc, itching, odor or dryness           No pelvic pain, dyspareunia, or abnormal vaginal bleeding     VITALS:  Blood pressure 100/63, pulse 88, height 5' 2" (1.575 m), weight 100.1 kg (220 lb 9.6 oz).  Body mass index is 40.35 kg/m².     PHYSICAL EXAM-  APPEARANCE: Well appearing, in no acute distress.   NECK: Neck symmetric   CV:  Normal rate    PULM: Normal resp rate, no resp distress, normal resp effort   PSYCH:  Normal mood and affect, cooperative   SKIN: No rashes, lesions, or abnormal bruising   LYMPH: No inguinal or axillary adenopathy   ABD: Soft, without tenderness or masses.   BREASTS: Symmetrical, no nipple changes, no skin changes, No palpable masses   PELVIC:  VULVA: Normal female genitalia.No lesions.   URETHRAL MEATUS: No masses, no significant prolapse.   BLADDER/ URETHRA: No masses or suprapubic tenderness   VAGINA/ CUFF: No masses or erythema. No discharge   PELVIS: No masses, tenderness, or fullness "   ANUS/ PERINEUM: Normal tone.  No lesions.     *female chaperone present for entire exam    ASSESSMENT and PLAN:  Encounter for well woman exam with routine gynecological exam  -     Mammo Digital Screening Bilat w/ Hernandez; Future; Expected date: 04/19/2023    Breast cancer screening by mammogram  -     Liquid-based pap smear, screening     FOLLOWUP:  1 yr for wwe or sooner prn    COUNSELING:  Patient was counseled today on recommendation for yearly pelvic exam/current Pap guidelines, self breast exams, annual screening mammograms and routine screening colonoscopy starting at age 45.  Encouraged patient to see her PCP for other health maintenance.

## 2023-04-13 LAB — Lab: NORMAL

## 2024-04-22 NOTE — PROGRESS NOTES
CC:  WELL WOMAN ( hyst with [facility])    Patient Care Team:  Abdirizak Ponce MD as PCP - General (Pediatrics)    Last visit with me was on  2023 for wwe    HPI:  Patient is a 48 y.o. who presents for her well woman exam today.  History reviewed with patient.   Patient having irritation externally since weight loss- using anti chafing powder and keeps it from getting worse but is not better either    Her hyst was in    for fibroids, pelvic pain, endometriosis and and severe adhesions        HRT:  never used  HX ABNL PAPS: none    REVIEW OF PRIOR DATA/ HEALTH MAINTENANCE:  LAST ANNUAL:   2023    LAST MMG (screening)- 2023-  out of town    LAST COLONOSCOPY- - by Dr out of town - q 10 yrs (neg)     Past Medical History:   Diagnosis Date    Abdominal adhesions     severe- pt reports 15 scopes, colonoscopy showd partial obstruction    ADD (attention deficit disorder)     Diabetes     History of left nephrectomy     Hyperinsulinemia     Hyperlipidemia     IBS (irritable bowel syndrome)     Kidney stones     Low vitamin D level     Pneumonia     when she had covid    Tachycardia     sees cards- sometimes to 160s- on metoprolol    Unilateral inguinal hernia, without obstruction or gangrene, not specified as recurrent      SURGICAL HX:   has a past surgical history that includes Hip surgery; Reduction of both breasts; Total abdominal hysterectomy w/ bilateral salpingoophorectomy (); Tonsillectomy and adenoidectomy (); Cholecystectomy; Kidney surgery (Left, ); Bone graft (); Laparoscopy; Nephrectomy (Left, ); and Hip arthroscopy w/ labral repair (2023).    SOCIAL HX:    reports that she has never smoked. She has never used smokeless tobacco. She reports current alcohol use of about 2.0 standard drinks of alcohol per week. She reports that she does not use drugs.    Current Outpatient Medications   Medication Instructions    dextroamphetamine-amphetamine (ADDERALL  "XR) 25 MG 24 hr capsule 25 mg, Oral, Every morning    estradioL (ESTRACE) 0.5 mg, Oral    levothyroxine (SYNTHROID) 50 mcg, Oral    metoprolol tartrate 75 mg Tab     MOUNJARO 7.5 mg/0.5 mL PnIj     pantoprazole (PROTONIX) 20 mg, Oral    tirzepatide (MOUNJARO SUBQ)      VITALS:  Blood pressure 110/67, height 5' 2" (1.575 m), weight 71.2 kg (157 lb).  Body mass index is 28.72 kg/m².     PHYSICAL EXAM-  APPEARANCE: Well appearing, in no acute distress.   CV/PULM: No resp distress, normal resp effort   PSYCH:  Normal mood and affect, cooperative   SKIN: No rashes, lesions, or abnormal bruising   ABD: Soft, without tenderness or masses.   BREAST: No skin changes or nipple dc. No palpable masses or tenderness  PELVIC:  VULVA: Normal female genitalia.+yeast dermatitis in area of perineum between posterior introitus and anus  URETHRAL MEATUS: No masses, no significant prolapse.   BLADDER/ URETHRA: No masses or suprapubic tenderness   VAGINA/ CVX: No lesions. +atrophic changes. +yeast  UTERUS: Normal size/shape, mobile, non-tender   ADNEXA: No masses, tenderness, or fullness   ANUS/ PERINEUM: Normal tone.  No lesions.           *patient verbally consented for exam and female chaperone present for entire exam    ASSESSMENT and PLAN:  Encounter for well woman exam with routine gynecological exam  -     Liquid-based pap smear, screening    Breast cancer screening by mammogram  -     Mammo Digital Screening [facility]/ [facility]; Future; Expected date: 05/06/2024       FOLLOWUP:  1 yr for wwe or sooner prn    COUNSELING:  Patient was counseled today on recommendation for yearly pelvic exam/current Pap guidelines, self breast exams, annual screening mammograms and routine screening colonoscopy starting at age 45.  Encouraged patient to see her PCP for other health maintenance.       "

## 2024-04-23 ENCOUNTER — OFFICE VISIT (OUTPATIENT)
Dept: OBSTETRICS AND GYNECOLOGY | Facility: CLINIC | Age: 49
End: 2024-04-23
Payer: COMMERCIAL

## 2024-04-23 VITALS
DIASTOLIC BLOOD PRESSURE: 67 MMHG | BODY MASS INDEX: 28.89 KG/M2 | SYSTOLIC BLOOD PRESSURE: 110 MMHG | WEIGHT: 157 LBS | HEIGHT: 62 IN

## 2024-04-23 DIAGNOSIS — Z12.31 BREAST CANCER SCREENING BY MAMMOGRAM: ICD-10-CM

## 2024-04-23 DIAGNOSIS — B37.2 YEAST DERMATITIS: ICD-10-CM

## 2024-04-23 DIAGNOSIS — Z01.419 ENCOUNTER FOR WELL WOMAN EXAM WITH ROUTINE GYNECOLOGICAL EXAM: Primary | ICD-10-CM

## 2024-04-23 DIAGNOSIS — Z79.890 HORMONE REPLACEMENT THERAPY (HRT): ICD-10-CM

## 2024-04-23 PROCEDURE — 3078F DIAST BP <80 MM HG: CPT | Mod: CPTII,S$GLB,, | Performed by: OBSTETRICS & GYNECOLOGY

## 2024-04-23 PROCEDURE — 99396 PREV VISIT EST AGE 40-64: CPT | Mod: S$GLB,,, | Performed by: OBSTETRICS & GYNECOLOGY

## 2024-04-23 PROCEDURE — 1159F MED LIST DOCD IN RCRD: CPT | Mod: CPTII,S$GLB,, | Performed by: OBSTETRICS & GYNECOLOGY

## 2024-04-23 PROCEDURE — 3008F BODY MASS INDEX DOCD: CPT | Mod: CPTII,S$GLB,, | Performed by: OBSTETRICS & GYNECOLOGY

## 2024-04-23 PROCEDURE — 1160F RVW MEDS BY RX/DR IN RCRD: CPT | Mod: CPTII,S$GLB,, | Performed by: OBSTETRICS & GYNECOLOGY

## 2024-04-23 PROCEDURE — 3074F SYST BP LT 130 MM HG: CPT | Mod: CPTII,S$GLB,, | Performed by: OBSTETRICS & GYNECOLOGY

## 2024-04-23 PROCEDURE — 99459 PELVIC EXAMINATION: CPT | Mod: S$GLB,,, | Performed by: OBSTETRICS & GYNECOLOGY

## 2024-04-23 RX ORDER — TIRZEPATIDE 7.5 MG/.5ML
INJECTION, SOLUTION SUBCUTANEOUS
COMMUNITY
Start: 2024-03-15

## 2024-04-23 RX ORDER — PANTOPRAZOLE SODIUM 20 MG/1
20 TABLET, DELAYED RELEASE ORAL
COMMUNITY
Start: 2024-03-26

## 2024-04-23 RX ORDER — ESTRADIOL 0.5 MG/1
0.5 TABLET ORAL
COMMUNITY
Start: 2024-03-25 | End: 2024-04-23 | Stop reason: SDUPTHER

## 2024-04-23 RX ORDER — ESTRADIOL 0.5 MG/1
0.5 TABLET ORAL DAILY
Qty: 90 TABLET | Refills: 3 | Status: SHIPPED | OUTPATIENT
Start: 2024-04-23

## 2024-04-23 RX ORDER — METOPROLOL TARTRATE 75 MG/1
TABLET, FILM COATED ORAL
COMMUNITY
Start: 2024-01-11

## 2024-04-23 RX ORDER — FLUCONAZOLE 200 MG/1
200 TABLET ORAL DAILY
Qty: 5 TABLET | Refills: 0 | Status: SHIPPED | OUTPATIENT
Start: 2024-04-23

## 2024-04-26 LAB — Lab: NORMAL

## 2025-02-13 ENCOUNTER — TELEPHONE (OUTPATIENT)
Dept: OBSTETRICS AND GYNECOLOGY | Facility: CLINIC | Age: 50
End: 2025-02-13
Payer: COMMERCIAL

## 2025-02-13 DIAGNOSIS — R92.8 ABNORMAL FINDING ON MAMMOGRAPHY: Primary | ICD-10-CM

## 2025-02-13 NOTE — TELEPHONE ENCOUNTER
----- Message from Delma Gregg MD sent at 2/13/2025  4:53 PM CST -----  Please let pt know her mmg shows an area of asymmetry in the right breast and they want to do some additional views.Ill send those orders now so she can call and set that up

## 2025-02-13 NOTE — PROGRESS NOTES
Please let pt know her mmg shows an area of asymmetry in the right breast and they want to do some additional views.Ill send those orders now so she can call and set that up

## 2025-03-11 ENCOUNTER — TELEPHONE (OUTPATIENT)
Dept: OBSTETRICS AND GYNECOLOGY | Facility: CLINIC | Age: 50
End: 2025-03-11
Payer: COMMERCIAL

## 2025-03-11 ENCOUNTER — RESULTS FOLLOW-UP (OUTPATIENT)
Dept: OBSTETRICS AND GYNECOLOGY | Facility: CLINIC | Age: 50
End: 2025-03-11

## 2025-03-11 DIAGNOSIS — R92.8 ABNORMAL FINDING ON MAMMOGRAPHY: Primary | ICD-10-CM

## 2025-03-11 NOTE — TELEPHONE ENCOUNTER
"Pt called regarding her addtl views/us results which are not in her [facility] epic chart and have not come to us from [facility]. Located reports in [facility] system and printed and will scan into media.  However, they have called the area in her rt breast possible fibrosis or fibrocystic changes and classified it as birads-3 "probably benign". I can place a rf to dr ash if she would like to get this evaluated further  "

## 2025-03-11 NOTE — TELEPHONE ENCOUNTER
Attempted to contact patient, no answer. Left patient voice mail to return call to clinic to discuss her additional breast imaging results.

## 2025-03-11 NOTE — TELEPHONE ENCOUNTER
Spoke with patient. Two pt identifiers confirmed. Notified patient of her additional breast imaging results. Patient verbalized understanding. Patient does want a referral sent to Dr. Lr.

## 2025-03-11 NOTE — TELEPHONE ENCOUNTER
----- Message from Vdolg sent at 3/11/2025  2:18 PM CDT -----  Contact: Irais  Patient had some diagnostic testing done. Inquiring about the results. Please call back at .[phone].

## 2025-03-11 NOTE — TELEPHONE ENCOUNTER
----- Message from Mark sent at 3/11/2025  3:35 PM CDT -----  Contact: Irais  Type:  Patient Returning CallWho Called:Michael Left Message for Patient:Jeffery Rivera LPNDoes the patient know what this is regarding?:yesWould the patient rather a call back or a response via [facility]? Call Back Best Call Back Number:[phone] Information:

## 2025-05-08 PROBLEM — Z80.3 FAMILY HISTORY OF MALIGNANT NEOPLASM OF BREAST: Status: ACTIVE | Noted: 2025-04-07

## 2025-05-08 PROBLEM — G47.00 INSOMNIA: Status: ACTIVE | Noted: 2025-03-24

## 2025-05-08 PROBLEM — R00.2 PALPITATION: Status: ACTIVE | Noted: 2025-03-24

## 2025-05-08 RX ORDER — FLUTICASONE PROPIONATE 50 MCG
SPRAY, SUSPENSION (ML) NASAL
COMMUNITY
Start: 2024-12-11 | End: 2025-05-09

## 2025-05-08 RX ORDER — CETIRIZINE HYDROCHLORIDE 10 MG/1
10 TABLET ORAL
COMMUNITY
Start: 2024-12-11 | End: 2025-05-09

## 2025-05-08 RX ORDER — METOPROLOL TARTRATE 100 MG/1
100 TABLET ORAL
COMMUNITY
End: 2025-05-09

## 2025-05-08 RX ORDER — LISDEXAMFETAMINE DIMESYLATE 70 MG/1
CAPSULE ORAL
COMMUNITY
End: 2025-05-09

## 2025-05-08 RX ORDER — TIRZEPATIDE 10 MG/.5ML
INJECTION, SOLUTION SUBCUTANEOUS
COMMUNITY
Start: 2025-02-20 | End: 2025-05-09

## 2025-05-08 RX ORDER — PANTOPRAZOLE SODIUM 20 MG/1
20 TABLET, DELAYED RELEASE ORAL
COMMUNITY
Start: 2025-03-15

## 2025-05-08 RX ORDER — ESTRADIOL 0.5 MG/1
0.5 TABLET ORAL
COMMUNITY
Start: 2025-03-15 | End: 2025-05-09 | Stop reason: SDUPTHER

## 2025-05-08 RX ORDER — ZOLPIDEM TARTRATE 5 MG/1
5 TABLET ORAL NIGHTLY PRN
COMMUNITY
Start: 2025-03-26 | End: 2025-05-09

## 2025-05-08 RX ORDER — TRAZODONE HYDROCHLORIDE 50 MG/1
50 TABLET ORAL NIGHTLY
COMMUNITY
Start: 2025-03-24 | End: 2025-05-09

## 2025-05-08 RX ORDER — TRIAZOLAM 0.25 MG/1
TABLET ORAL
COMMUNITY
Start: 2025-02-24 | End: 2025-05-09

## 2025-05-08 NOTE — PROGRESS NOTES
CC:  WELL WOMAN (menop/ hyst with [facility])  Patient Care Team:  Abdirizak Ponce MD as PCP - General (Pediatrics)  Brittni Lr MD as Consulting Physician (General Surgery)        HPI:  Patient is a 49 y.o. who presents for her well woman exam today.  History reviewed with patient. Had breast bx last week and was benign. Also feels like her jamshid is getting worse and having to wear pads daily. No urgency symptoms or uti symptoms, only with cough/sneeze. No vaginal dryness  Patient is without complaints or concerns today.     Her hyst was in for fibroids and endometriosis  estrogen hrt  HX ABNL PAPS: none    REVIEW OF PRIOR DATA/ HEALTH MAINTENANCE:  LAST ANNUAL:   2024   LAST MMG- 2025-  [place]    LAST COLONOSCOPY- - by  out of town - q 10 yrs (neg)       Past Medical History:   Diagnosis Date    Abdominal adhesions     severe- pt reports 15 scopes, colonoscopy showd partial obstruction    ADD (attention deficit disorder)     Diabetes     History of left nephrectomy     Hyperinsulinemia     Hyperlipidemia     IBS (irritable bowel syndrome)     Kidney stones     Low vitamin D level     Pneumonia     when she had covid    Tachycardia     sees cards- sometimes to 160s- on metoprolol    Unilateral inguinal hernia, without obstruction or gangrene, not specified as recurrent      SURGICAL HX:   has a past surgical history that includes Hip surgery; Reduction of both breasts; Total abdominal hysterectomy w/ bilateral salpingoophorectomy (); Tonsillectomy and adenoidectomy (); Cholecystectomy; Kidney surgery (Left, ); Bone graft (); Laparoscopy; Nephrectomy (Left, ); Hip arthroscopy w/ labral repair (2023); Appendectomy; Hysterectomy; Hernia repair; Abdominoplasty; and Breast biopsy.    SOCIAL HX:    reports that she has never smoked. She has never used smokeless tobacco. She reports current alcohol use of about 2.0 standard drinks of alcohol per week.  She reports that she does not use drugs.    Current Outpatient Medications   Medication Instructions    estradioL (ESTRACE) 0.5 mg, Oral, Daily    levothyroxine (SYNTHROID) 50 mcg    metoprolol succinate (TOPROL-XL) 25 mg    MOUNJARO 12.5 mg/0.5 mL PnIj INJECT 12.5 MG UNDER THE SKIN ONCE A WEEK IN THE EVENING    pantoprazole (PROTONIX) 20 mg     VITALS:  Blood pressure 99/72, weight 84.8 kg (187 lb).  Body mass index is 34.2 kg/m².     PHYSICAL EXAM-  APPEARANCE: Well appearing, in no acute distress.   NECK: Neck symmetric   CV/PULM: No resp distress, normal resp effort   PSYCH:  Normal mood and affect, cooperative   SKIN: No rashes, lesions, or abnormal bruising   ABD: Soft, without tenderness or masses.    BREAST: No skin changes or nipple dc.  No palpable masses or tenderness  PELVIC:  VULVA: Normal female genitalia. No lesions.   URETHRAL MEATUS: No masses, no significant prolapse.  BLADDER/ URETHRA: No masses or suprapubic tenderness   VAGINA/ CUFF: No lesions. +atrophic changes. No discharge   PELVIS: No masses, tenderness, or fullness on bimanual exam   ANUS/ PERINEUM: Normal tone.  No lesions.          *patient verbally consented for exam and female chaperone present for entire exam     ASSESSMENT and PLAN:  Hormone replacement therapy (HRT)    Encounter for well woman exam with routine gynecological exam  -     Liquid-based pap smear, screening    Breast cancer screening by mammogram  -     Mammo Digital Screening [facility]/ [facility] ([facility]); Future; Expected date: 05/22/2025    AUDREY (stress urinary incontinence, female)  -     estradioL (ESTRACE) 0.5 MG tablet; Take 1 tablet (0.5 mg total) by mouth once daily.  Dispense: 90 tablet; Refill: 3  -     Ambulatory referral/consult to Urology; Future; Expected date: 05/16/2025       FOLLOWUP:  1 year for wwe or sooner prn    COUNSELING:  Patient was counseled today on recommendations for yearly pelvic exam, current Pap guidelines, self breast exams, annual screening  mammograms, routine screening colonoscopy, and screening bone density. Reviewed calcium and vitamin D supplements and weight bearing exercise to minimize risks.  Encouraged patient to see her PCP for other health maintenance.

## 2025-05-09 ENCOUNTER — OFFICE VISIT (OUTPATIENT)
Dept: OBSTETRICS AND GYNECOLOGY | Facility: CLINIC | Age: 50
End: 2025-05-09
Payer: COMMERCIAL

## 2025-05-09 VITALS — SYSTOLIC BLOOD PRESSURE: 99 MMHG | WEIGHT: 187 LBS | BODY MASS INDEX: 34.2 KG/M2 | DIASTOLIC BLOOD PRESSURE: 72 MMHG

## 2025-05-09 DIAGNOSIS — Z12.31 BREAST CANCER SCREENING BY MAMMOGRAM: ICD-10-CM

## 2025-05-09 DIAGNOSIS — Z79.890 HORMONE REPLACEMENT THERAPY (HRT): Primary | ICD-10-CM

## 2025-05-09 DIAGNOSIS — Z01.419 ENCOUNTER FOR WELL WOMAN EXAM WITH ROUTINE GYNECOLOGICAL EXAM: ICD-10-CM

## 2025-05-09 DIAGNOSIS — N39.3 SUI (STRESS URINARY INCONTINENCE, FEMALE): ICD-10-CM

## 2025-05-09 RX ORDER — METOPROLOL SUCCINATE 25 MG/1
25 TABLET, EXTENDED RELEASE ORAL
COMMUNITY
Start: 2025-03-15

## 2025-05-09 RX ORDER — TIRZEPATIDE 12.5 MG/.5ML
INJECTION, SOLUTION SUBCUTANEOUS
COMMUNITY
Start: 2025-04-16

## 2025-05-09 RX ORDER — ESTRADIOL 0.5 MG/1
0.5 TABLET ORAL DAILY
Qty: 90 TABLET | Refills: 3 | Status: SHIPPED | OUTPATIENT
Start: 2025-05-09

## 2025-05-12 ENCOUNTER — OFFICE VISIT (OUTPATIENT)
Dept: UROLOGY | Facility: CLINIC | Age: 50
End: 2025-05-12
Payer: COMMERCIAL

## 2025-05-12 VITALS
SYSTOLIC BLOOD PRESSURE: 128 MMHG | HEART RATE: 108 BPM | DIASTOLIC BLOOD PRESSURE: 74 MMHG | WEIGHT: 183 LBS | BODY MASS INDEX: 33.68 KG/M2 | HEIGHT: 62 IN

## 2025-05-12 DIAGNOSIS — N39.3 SUI (STRESS URINARY INCONTINENCE, FEMALE): Primary | ICD-10-CM

## 2025-05-12 DIAGNOSIS — N32.81 OVERACTIVE BLADDER: ICD-10-CM

## 2025-05-12 PROCEDURE — 3078F DIAST BP <80 MM HG: CPT | Mod: CPTII,,, | Performed by: FAMILY MEDICINE

## 2025-05-12 PROCEDURE — 99204 OFFICE O/P NEW MOD 45 MIN: CPT | Mod: S$PBB,,, | Performed by: FAMILY MEDICINE

## 2025-05-12 PROCEDURE — 3074F SYST BP LT 130 MM HG: CPT | Mod: CPTII,,, | Performed by: FAMILY MEDICINE

## 2025-05-12 PROCEDURE — 3044F HG A1C LEVEL LT 7.0%: CPT | Mod: CPTII,,, | Performed by: FAMILY MEDICINE

## 2025-05-12 PROCEDURE — 1159F MED LIST DOCD IN RCRD: CPT | Mod: CPTII,,, | Performed by: FAMILY MEDICINE

## 2025-05-12 PROCEDURE — 3008F BODY MASS INDEX DOCD: CPT | Mod: CPTII,,, | Performed by: FAMILY MEDICINE

## 2025-05-12 RX ORDER — SUVOREXANT 10 MG/1
10 TABLET, FILM COATED ORAL
COMMUNITY
Start: 2025-05-12

## 2025-05-12 RX ORDER — DEXTROAMPHETAMINE SACCHARATE, AMPHETAMINE ASPARTATE MONOHYDRATE, DEXTROAMPHETAMINE SULFATE AND AMPHETAMINE SULFATE 6.25; 6.25; 6.25; 6.25 MG/1; MG/1; MG/1; MG/1
25 CAPSULE, EXTENDED RELEASE ORAL
COMMUNITY
Start: 2025-05-12 | End: 2025-07-11

## 2025-05-12 RX ORDER — SOLIFENACIN SUCCINATE 10 MG/1
10 TABLET, FILM COATED ORAL DAILY
Qty: 30 TABLET | Refills: 11 | Status: SHIPPED | OUTPATIENT
Start: 2025-05-12 | End: 2026-05-12

## 2025-05-12 NOTE — PROGRESS NOTES
Subjective:       Patient ID: Irais Echeverria is a 49 y.o. female.    Chief Complaint: Urinary Incontinence      HPI: 49-year-old female patient presenting to the clinic to establish care for urinary incontinence.  Patient complains of leakage with coughing and sneezing as well as urinary frequency and nocturia x5.  She has never been on any medications.  She has to wear multiple panty liners per day at this point         Past Medical History:   Past Medical History:   Diagnosis Date    Abdominal adhesions     severe- pt reports 15 scopes, colonoscopy showd partial obstruction    ADD (attention deficit disorder)     Diabetes     History of left nephrectomy     Hyperinsulinemia     Hyperlipidemia     IBS (irritable bowel syndrome)     Kidney stones     Low vitamin D level     Pneumonia 2020    when she had covid    Tachycardia     sees cards- sometimes to 160s- on metoprolol    Unilateral inguinal hernia, without obstruction or gangrene, not specified as recurrent        Past Surgical Historical:   Past Surgical History:   Procedure Laterality Date    ABDOMINOPLASTY      APPENDECTOMY      BONE GRAFT  2018    for knee becuase wasnt healing    BREAST BIOPSY      CHOLECYSTECTOMY      HERNIA REPAIR      HIP ARTHROSCOPY W/ LABRAL REPAIR  03/20/2023    HIP SURGERY      had 3 surgeries for torn labrum    HYSTERECTOMY      KIDNEY SURGERY Left 1988    blocked upj from stone    LAPAROSCOPY      multiple laparoscopies for adhesions (15 per pt)    NEPHRECTOMY Left 1989    after it necrosed    REDUCTION OF BOTH BREASTS      TONSILLECTOMY AND ADENOIDECTOMY  2018    TOTAL ABDOMINAL HYSTERECTOMY W/ BILATERAL SALPINGOOPHORECTOMY  1999    fibroids, endo, severe adhesions- never took hrt        Medications:   Medication List with Changes/Refills   New Medications    SOLIFENACIN (VESICARE) 10 MG TABLET    Take 1 tablet (10 mg total) by mouth once daily.   Current Medications    DEXTROAMPHETAMINE-AMPHETAMINE (ADDERALL XR) 25 MG 24 HR  CAPSULE    Take 25 mg by mouth.    ESTRADIOL (ESTRACE) 0.5 MG TABLET    Take 1 tablet (0.5 mg total) by mouth once daily.    LEVOTHYROXINE (SYNTHROID) 50 MCG TABLET    Take 50 mcg by mouth.    METOPROLOL SUCCINATE (TOPROL-XL) 25 MG 24 HR TABLET    Take 25 mg by mouth.    MOUNJARO 12.5 MG/0.5 ML PNIJ    INJECT 12.5 MG UNDER THE SKIN ONCE A WEEK IN THE EVENING    PANTOPRAZOLE (PROTONIX) 20 MG TABLET    Take 20 mg by mouth.    SUVOREXANT (BELSOMRA) 10 MG TAB    Take 10 mg by mouth.        Past Social History: Social History[1]    Allergies:   Review of patient's allergies indicates:   Allergen Reactions    Cefprozil     Levofloxacin         Family History:   Family History   Problem Relation Name Age of Onset    Breast cancer Other          MGGM    Diabetes Maternal Grandmother      Breast cancer Maternal Aunt  65        Review of Systems:  Review of Systems   Constitutional:  Negative for activity change, appetite change, chills, diaphoresis, fatigue, fever and unexpected weight change.   HENT:  Negative for congestion, dental problem, drooling, ear discharge, ear pain, facial swelling, hearing loss, mouth sores, nosebleeds, postnasal drip, rhinorrhea, sinus pressure, sinus pain, sneezing, sore throat, tinnitus, trouble swallowing and voice change.    Eyes:  Negative for photophobia, pain, discharge, redness, itching and visual disturbance.   Respiratory:  Negative for apnea, cough, choking, chest tightness, shortness of breath, wheezing and stridor.    Cardiovascular:  Negative for chest pain and leg swelling.   Gastrointestinal:  Negative for abdominal distention, abdominal pain, anal bleeding, blood in stool, constipation, diarrhea, nausea, rectal pain and vomiting.   Endocrine: Negative for cold intolerance, heat intolerance, polydipsia, polyphagia and polyuria.   Genitourinary:  Positive for frequency and urgency. Negative for decreased urine volume, difficulty urinating, dyspareunia, dysuria, enuresis, flank  pain, genital sores, hematuria, menstrual problem, pelvic pain, vaginal bleeding, vaginal discharge and vaginal pain.   Musculoskeletal:  Negative for arthralgias, back pain, gait problem, joint swelling, myalgias, neck pain and neck stiffness.   Skin:  Negative for color change, pallor, rash and wound.   Allergic/Immunologic: Negative for environmental allergies, food allergies and immunocompromised state.   Neurological:  Negative for dizziness, tremors, seizures, syncope, facial asymmetry, speech difficulty, weakness, light-headedness, numbness and headaches.   Hematological:  Negative for adenopathy. Does not bruise/bleed easily.   Psychiatric/Behavioral:  Negative for agitation, behavioral problems, confusion, decreased concentration, dysphoric mood, hallucinations, self-injury, sleep disturbance and suicidal ideas. The patient is not nervous/anxious and is not hyperactive.        Physical Exam:  Physical Exam  Vitals reviewed.   Constitutional:       Appearance: She is normal weight.   HENT:      Head: Normocephalic.      Nose: Nose normal.      Mouth/Throat:      Mouth: Mucous membranes are moist.      Pharynx: Oropharynx is clear.   Eyes:      Extraocular Movements: Extraocular movements intact.      Conjunctiva/sclera: Conjunctivae normal.   Cardiovascular:      Rate and Rhythm: Normal rate.      Pulses: Normal pulses.   Pulmonary:      Effort: Pulmonary effort is normal. No respiratory distress.   Abdominal:      General: Abdomen is flat.   Musculoskeletal:         General: Normal range of motion.      Cervical back: Normal range of motion and neck supple.   Skin:     General: Skin is dry.   Neurological:      General: No focal deficit present.      Mental Status: She is alert and oriented to person, place, and time. Mental status is at baseline.   Psychiatric:         Mood and Affect: Mood normal.         Behavior: Behavior normal.         Thought Content: Thought content normal.         Judgment:  Judgment normal.         Assessment/Plan:     Stress incontinence:  Instructed the patient to practice timed voiding.  Offered pelvic floor therapy however the patient declined at this time and would like to practice Kegel exercises on her own.  Impressed upon her the importance of diligently practicing Kegel's in order to see improvement.    Overactive bladder: PVR 0 mL.  Decrease total fluid intake.  Avoid caffeinated carbonated beverages.  Practice timed voiding.  Discontinue fluids 4 hours prior to bed to help improve nocturia.  We will start patient on VESIcare 10 mg at this time.    Follow up in 6 months or sooner if needed  Problem List Items Addressed This Visit    None  Visit Diagnoses         AUDREY (stress urinary incontinence, female)    -  Primary    Relevant Medications    solifenacin (VESICARE) 10 MG tablet                    [1]   Social History  Socioeconomic History    Marital status:    Tobacco Use    Smoking status: Never    Smokeless tobacco: Never   Substance and Sexual Activity    Alcohol use: Yes     Alcohol/week: 2.0 standard drinks of alcohol     Types: 2 Cans of beer per week     Comment: occ    Drug use: Never    Sexual activity: Yes     Partners: Male     Birth control/protection: See Surgical Hx     Social Drivers of Health     Food Insecurity: No Food Insecurity (2/25/2025)    Received from [facility] Vital Sign     Worried About Running Out of Food in the Last Year: Never true     Ran Out of Food in the Last Year: Never true

## 2025-05-13 ENCOUNTER — RESULTS FOLLOW-UP (OUTPATIENT)
Dept: OBSTETRICS AND GYNECOLOGY | Facility: CLINIC | Age: 50
End: 2025-05-13